# Patient Record
Sex: MALE | Race: OTHER | HISPANIC OR LATINO | URBAN - METROPOLITAN AREA
[De-identification: names, ages, dates, MRNs, and addresses within clinical notes are randomized per-mention and may not be internally consistent; named-entity substitution may affect disease eponyms.]

---

## 2024-02-15 ENCOUNTER — EMERGENCY (EMERGENCY)
Facility: HOSPITAL | Age: 44
LOS: 1 days | Discharge: HOME | End: 2024-02-15
Attending: EMERGENCY MEDICINE
Payer: SELF-PAY

## 2024-02-15 VITALS
SYSTOLIC BLOOD PRESSURE: 129 MMHG | OXYGEN SATURATION: 98 % | RESPIRATION RATE: 20 BRPM | TEMPERATURE: 98 F | HEIGHT: 67 IN | DIASTOLIC BLOOD PRESSURE: 80 MMHG | WEIGHT: 145.95 LBS | HEART RATE: 70 BPM

## 2024-02-15 VITALS
RESPIRATION RATE: 20 BRPM | DIASTOLIC BLOOD PRESSURE: 74 MMHG | HEART RATE: 60 BPM | OXYGEN SATURATION: 99 % | SYSTOLIC BLOOD PRESSURE: 114 MMHG

## 2024-02-15 PROCEDURE — 73590 X-RAY EXAM OF LOWER LEG: CPT | Mod: 26,LT

## 2024-02-15 PROCEDURE — 73590 X-RAY EXAM OF LOWER LEG: CPT

## 2024-02-15 PROCEDURE — 99284 EMERGENCY DEPT VISIT MOD MDM: CPT

## 2024-02-15 PROCEDURE — 96372 THER/PROPH/DIAG INJ SC/IM: CPT

## 2024-02-15 PROCEDURE — 99283 EMERGENCY DEPT VISIT LOW MDM: CPT

## 2024-02-15 PROCEDURE — T1013: CPT

## 2024-02-15 RX ORDER — KETOROLAC TROMETHAMINE 30 MG/ML
30 SYRINGE (ML) INJECTION ONCE
Refills: 0 | Status: DISCONTINUED | OUTPATIENT
Start: 2024-02-15 | End: 2024-02-15

## 2024-02-15 RX ORDER — IBUPROFEN 200 MG
1 TABLET ORAL
Qty: 15 | Refills: 0
Start: 2024-02-15 | End: 2024-02-19

## 2024-02-15 RX ADMIN — Medication 30 MILLIGRAM(S): at 13:17

## 2024-02-15 NOTE — ED ADULT NURSE NOTE - OBJECTIVE STATEMENT
Assumed care of pt at 1325 in . Pt A&Ox4 c/o left lower leg pain due to a slip and fall, the pt states that he fell at work, leg was splinted by EMS, no deformity noted, pt denies N/V/D/CP/SOB/head injury, pt resting comfortably showing no signs of respiratory distress or pain, the pt is calm and cooperative    left lower leg

## 2024-02-15 NOTE — ED PROVIDER NOTE - NSFOLLOWUPINSTRUCTIONS_ED_ALL_ED_FT
Continue with over-the-counter pain meds  Continue with bacitracin to abrasion region as discussed  Please follow-up with primary care provider

## 2024-02-15 NOTE — ED PROVIDER NOTE - CLINICAL SUMMARY MEDICAL DECISION MAKING FREE TEXT BOX
43-year-old male present to ED complaining of leg pain status post injury while at work.  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  Patient  stated that the complaining of diarrhea and or his left leg.  Patient admits to pain with walking and some scratching his leg.  Patient denies any significant past medical or surgical illness.  HEENT: Normal findings, Eyes : PERRLA, EOMI , Nares cler and Throat : WNL  Lungs: Clear B/L with good air entry  CVS: S1-S2 , with no murmurs  Abd : Normal BS, with no tenderness or organomegaly  Ext: Normal findings:   Left leg examination no deformity noted, slight abrasion to medial aspect of leg.  Normal range of motion to ankle and toes.  Normal DP and PT intact  X-ray negative for fracture. 43-year-old male present to ED complaining of leg pain status post injury while at work.  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  Patient  stated that the complaining of diarrhea and or his left leg.  Patient admits to pain with walking and some scratching his leg.  Patient denies any significant past medical or surgical illness.  HEENT: Normal findings, Eyes : PERRLA, EOMI , Nares clear and Throat : WNL  Lungs: Clear B/L with good air entry  CVS: S1-S2 , with no murmurs  Abd : Normal BS, with no tenderness or organomegaly  Ext: Normal findings:   Left leg examination no deformity noted, slight abrasion to medial aspect of leg.  Normal range of motion to ankle and toes.  Normal DP and PT intact  X-ray negative for fracture.

## 2024-02-15 NOTE — ED PROVIDER NOTE - PATIENT PORTAL LINK FT
You can access the FollowMyHealth Patient Portal offered by [facility] by registering at the following website: [email or web address]. By joining [facility]’s FollowMyHealth portal, you will also be able to view your health information using other applications (apps) compatible with our system.

## 2024-02-15 NOTE — ED PROVIDER NOTE - ATTENDING APP SHARED VISIT CONTRIBUTION OF CARE
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  Left leg stuck under conveyor belt/rolling pin.  Reports pain to left lower extremity.  Physical exam: No deformity, abrasions to medial and lateral aspect of distal third of left tib-fib, tenderness of distal third of left tib-fib with no significant leg swelling.  X-ray: No fracture.  A/P contusion/abrasion of left lower extremity.  Anticipatory guidance provided

## 2024-02-15 NOTE — ED PROVIDER NOTE - PRINCIPAL DIAGNOSIS
Mother updated on status.  Pt ambulatory with a limp, per mother this has improved mildly.   Left leg pain

## 2024-02-15 NOTE — ED PROVIDER NOTE - PROGRESS NOTE DETAILS
x-ray negative for fracture.  Patient made aware of x-ray finding.  Patient DC in stable condition and can follow-up with his primary care provider as discussed

## 2024-02-15 NOTE — ED PROVIDER NOTE - OBJECTIVE STATEMENT
43-year-old male present to ED complaining of leg pain status post injury while at work.  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  Patient  stated that the complaining of diarrhea and or his left leg.  Patient admits to pain with walking and some scratching his leg.  Patient denies any significant past medical or surgical illness.  Patient denies any numbness weakness or paresthesia.  Patient stated hurts to bear weight.  Patient denies any issue at this time.

## 2024-02-15 NOTE — ED ADULT NURSE NOTE - NSFALLRISKINTERV_ED_ALL_ED

## 2024-02-15 NOTE — ED ADULT TRIAGE NOTE - CHIEF COMPLAINT QUOTE
Pt BIBA c/o left lower leg pain s/p slip and fall at work.  Left leg splinted by EMS.  No deformity noted.  Denies head injury.
